# Patient Record
Sex: FEMALE | Race: OTHER | NOT HISPANIC OR LATINO | URBAN - METROPOLITAN AREA
[De-identification: names, ages, dates, MRNs, and addresses within clinical notes are randomized per-mention and may not be internally consistent; named-entity substitution may affect disease eponyms.]

---
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
## 2022-07-03 ENCOUNTER — EMERGENCY (EMERGENCY)
Facility: HOSPITAL | Age: 22
LOS: 1 days | Discharge: HOME | End: 2022-07-03
Attending: EMERGENCY MEDICINE
Payer: MEDICAID

## 2022-07-03 VITALS
OXYGEN SATURATION: 98 % | TEMPERATURE: 98 F | HEART RATE: 107 BPM | WEIGHT: 139.99 LBS | RESPIRATION RATE: 15 BRPM | DIASTOLIC BLOOD PRESSURE: 80 MMHG | SYSTOLIC BLOOD PRESSURE: 116 MMHG | HEIGHT: 66 IN

## 2022-07-03 PROCEDURE — 99283 EMERGENCY DEPT VISIT LOW MDM: CPT

## 2022-07-03 NOTE — ED ADULT TRIAGE NOTE - HEIGHT IN FEET
Your current Orthopaedic problem we are working together to treat is:  Left Achilles tendon rupture.    Plan for surgery at [facility] on Thursday, May 2 at 9:15am (arrive 2.5 hours before surgery at 6:45am)    SURGERY  I have recommended the following surgical procedure: repair of Achilles tendon. Please follow the specific instructions provided by the surgical scheduler for the testing, physician evaluation, pre-operative education and other preparatory requirements of the procedure to help assure a safe and successful result. You can contact Nena MCBRIDE at [phone].    Please shower with the hibiclens body wash the night before and the morning of surgery. Follow the included instructions.       It is recommended you schedule a follow-up appointment with Scott Alvarado MD  2 weeks after surgery.      Office hours are 8:00 am to 5:00 pm Monday through Friday. If it is urgent that you speak with someone outside of these hours, our [facility] Call Center will be able to assist you. You can reach the office by calling the:    [facility]  [address] [place] [address] [phone] during office hours  [phone] after office hours    We do highly recommend [employer], if you do not already have this. You can request access via the internet or by simply talking with a  at any of the clinics.   [email or web address].    You may receive a survey in the mail from [employer]. They mail and process patient satisfaction surveys for our clinic. Should you receive a survey, please take a few minutes to rate your experience with your visit.  We value your opinions and insights. Thank you in advance for your time and interest in responding.    Thank you for choosing [facility] as your Orthopaedic provider!    
5

## 2022-07-04 PROCEDURE — 99283 EMERGENCY DEPT VISIT LOW MDM: CPT

## 2022-07-04 RX ORDER — IBUPROFEN 200 MG
600 TABLET ORAL ONCE
Refills: 0 | Status: COMPLETED | OUTPATIENT
Start: 2022-07-04 | End: 2022-07-04

## 2022-07-04 RX ORDER — IBUPROFEN 200 MG
1 TABLET ORAL
Qty: 40 | Refills: 0
Start: 2022-07-04 | End: 2022-07-13

## 2022-07-04 RX ADMIN — Medication 1 TABLET(S): at 02:13

## 2022-07-04 RX ADMIN — Medication 600 MILLIGRAM(S): at 02:13

## 2022-07-04 NOTE — ED ADULT NURSE NOTE - OBJECTIVE STATEMENT
pt presents to ED with c/o Runny nose, congestion, right earache and toothache for 3 days. Denies any other complaints.

## 2022-07-04 NOTE — ED PROVIDER NOTE - OBJECTIVE STATEMENT
23yo F presents with 2 days right ear pain, dental pain and right nasal congestion. Denies cough or fever.

## 2022-07-04 NOTE — ED PROVIDER NOTE - NSFOLLOWUPINSTRUCTIONS_ED_ALL_ED_FT
take medications as prescribed.  Followup with dentists for reevaluation.  Return to ED if you develop fever>100.8F or facial swelling/pain.      Periodontal Disease       Periodontal disease, also called gum disease or periodontitis, is inflammation or infection that affects the tissue that surrounds and supports the teeth (periodontal tissue). Periodontal tissue includes the gums (gingivae), the tissues that hold the teeth in place (periodontal ligaments), and the bone surrounding the tooth (alveolar bone). Periodontal disease can affect the tissue around one tooth or many teeth. If this condition is not treated, it can cause you to lose your teeth.      What are the causes?    This condition is usually caused by plaque. Plaque contains harmful bacteria that can produce acids and other waste products that cause gums to become swollen and infected. If periodontal disease gets worse, it can also damage other supporting tissues.    Plaque can develop due to:  •Poor oral care, such as not brushing teeth enough or not visiting the dentist regularly.       •Eating and drinking too many sugary foods and beverages.      If the plaque remains on the teeth for a long time, it hardens to form tartar, which can only be removed by a dentist. As the disease progresses, space forms between the teeth and surrounding tissues (pocketing), which can lead to tooth loss.      What increases the risk?    This condition is more likely to develop in people who:  •Smoke.       •Use tobacco.      •Clench or grind their teeth.      •Use drugs and other substances.      •Are having changes in hormones that are caused by puberty, menopause, or pregnancy.      •Are under stress.      •Are taking certain medicines, such as steroids, antiseizure medicines, or medicines to treat cancer.      •Have diabetes.      •Have poor nutrition.      •Have a disease that interferes with the body's disease-fighting system (immune system).      •Have a family history of periodontal disease.        What are the signs or symptoms?    Symptoms of this condition include:  •Red or swollen gums.      •Bad breath that does not go away.      •Gums that have pulled away (receded) from the teeth.      •Gums that bleed easily.      •Teeth that are loose or .      •Pain when chewing.      •Changes in the way your teeth fit together.      •Sensitive teeth.        How is this diagnosed?    This condition is diagnosed with an exam of the tissue around your teeth. Your health care provider may also take an X-ray of your teeth and the tissue around them. He or she will also ask about your medical history.      How is this treated?    Treatment for this condition depends on the extent of the disease, which is determined by a dental exam. Treatment may include:  •Brushing and flossing regularly.      •A deep dental cleaning that requires scraping the buildup of plaque and tartar from below the gum line (scaling and root planing). This may be needed if the disease progresses.      •Antibiotic medicines. These may be taken by mouth, as a rinse, or placed directly into affected areas.    •Surgery, in some severe cases. This may include:  •Surgery flap. Gums are lifted up to remove tartar deposits or to reduce a pocket of periodontal disease.      •Bone and tissue grafting. Damaged areas are replaced in order to help healthy bone and tissue grow.          Follow these instructions at home:      Oral hygiene                   Practice good oral hygiene. To do this:  •Brush your teeth twice a day with a soft toothbrush.      •Floss between your teeth every day.      •Get regular dental exams.      Medicines     •Take over-the-counter and prescription medicines only as told by your health care provider.      •If you were prescribed an antibiotic medicine or a rinse, take or use it as told by your health care provider. Do not stop taking or using the antibiotic even if your condition improves.      General instructions     • Do not use any products that contain nicotine or tobacco, such as cigarettes, e-cigarettes, and chewing tobacco. These can delay healing. If you need help quitting, ask your health care provider.    •Eat a healthy diet that includes plenty of vegetables, fruits, whole grains, low-fat dairy products, and lean protein.  •Do not eat a lot of foods that are high in solid fats, added sugars, or salt.      •Avoid beverages that contain a lot of sugar.          Where to find more information    •American Dental Association: www.mouthhealthy.org        Contact a health care provider if:    •Your symptoms do not improve.        Get help right away if:    •You have swelling in your face, neck, or jaw.      •You have severe pain that does not get better with medicine.      •You have a fever.        Summary    •Periodontal disease, also called gum disease or periodontitis, is inflammation or infection that affects the tissue that surrounds and supports the teeth (periodontal tissue).      •You are more likely to get this disease if you smoke, use tobacco, have a family history of the disease, or have certain conditions, such as diabetes.      •Practice good oral hygiene. Brush your teeth twice a day with a soft toothbrush. Floss between your teeth every day.      • Do not use any products that contain nicotine or tobacco, such as cigarettes, e-cigarettes, and chewing tobacco. If you need help quitting, ask your health care provider.

## 2022-07-04 NOTE — ED PROVIDER NOTE - PATIENT PORTAL LINK FT
You can access the FollowMyHealth Patient Portal offered by [facility] by registering at the following website: [email or web address]. By joining [employer]’s FollowMyHealth portal, you will also be able to view your health information using other applications (apps) compatible with our system.

## 2022-07-04 NOTE — ED PROVIDER NOTE - NSFOLLOWUPCLINICS_GEN_ALL_ED_FT
[facility]  Dental  [address] [place] [id number]  Phone: [phone]  Fax:

## 2022-07-04 NOTE — ED PROVIDER NOTE - CLINICAL SUMMARY MEDICAL DECISION MAKING FREE TEXT BOX
23yo F presents with 2 days right ear pain, dental pain and right nasal congestion. Given augmentin and ibuprofen. patient stable for discharge to followup with dentistry.

## 2022-07-30 NOTE — ED PROVIDER NOTE - DR. NAME
Dr. Zion High updated hair pulled at [phone], patient hasn't voided yet at this time. Patient tried but states he doesn't have the urge. Bladder scan 172 ml. Okay to proceed with discharge and have [facility] follow up. Stacey Kendall)

## 2022-08-24 ENCOUNTER — EMERGENCY (EMERGENCY)
Facility: HOSPITAL | Age: 22
LOS: 1 days | Discharge: HOME | End: 2022-08-24
Attending: STUDENT IN AN ORGANIZED HEALTH CARE EDUCATION/TRAINING PROGRAM
Payer: MEDICAID

## 2022-08-24 VITALS
HEART RATE: 78 BPM | OXYGEN SATURATION: 99 % | RESPIRATION RATE: 16 BRPM | DIASTOLIC BLOOD PRESSURE: 74 MMHG | TEMPERATURE: 99 F | SYSTOLIC BLOOD PRESSURE: 110 MMHG

## 2022-08-24 VITALS
RESPIRATION RATE: 18 BRPM | TEMPERATURE: 98 F | HEART RATE: 97 BPM | HEIGHT: 66 IN | DIASTOLIC BLOOD PRESSURE: 71 MMHG | OXYGEN SATURATION: 97 % | WEIGHT: 138.89 LBS | SYSTOLIC BLOOD PRESSURE: 103 MMHG

## 2022-08-24 LAB
ALBUMIN SERPL ELPH-MCNC: 3.4 G/DL — LOW (ref 3.5–5)
ALP SERPL-CCNC: 85 U/L — SIGNIFICANT CHANGE UP (ref 40–120)
ALT FLD-CCNC: 16 U/L DA — SIGNIFICANT CHANGE UP (ref 10–60)
ANION GAP SERPL CALC-SCNC: 6 MMOL/L — SIGNIFICANT CHANGE UP (ref 5–17)
AST SERPL-CCNC: 16 U/L — SIGNIFICANT CHANGE UP (ref 10–40)
BASOPHILS # BLD AUTO: 0.04 K/UL — SIGNIFICANT CHANGE UP (ref 0–0.2)
BASOPHILS NFR BLD AUTO: 0.2 % — SIGNIFICANT CHANGE UP (ref 0–2)
BILIRUB SERPL-MCNC: 0.3 MG/DL — SIGNIFICANT CHANGE UP (ref 0.2–1.2)
BUN SERPL-MCNC: 6 MG/DL — LOW (ref 7–18)
CALCIUM SERPL-MCNC: 8.8 MG/DL — SIGNIFICANT CHANGE UP (ref 8.4–10.5)
CHLORIDE SERPL-SCNC: 107 MMOL/L — SIGNIFICANT CHANGE UP (ref 96–108)
CO2 SERPL-SCNC: 24 MMOL/L — SIGNIFICANT CHANGE UP (ref 22–31)
CREAT SERPL-MCNC: 0.78 MG/DL — SIGNIFICANT CHANGE UP (ref 0.5–1.3)
EGFR: 111 ML/MIN/1.73M2 — SIGNIFICANT CHANGE UP
EOSINOPHIL # BLD AUTO: 0.01 K/UL — SIGNIFICANT CHANGE UP (ref 0–0.5)
EOSINOPHIL NFR BLD AUTO: 0.1 % — SIGNIFICANT CHANGE UP (ref 0–6)
GLUCOSE SERPL-MCNC: 123 MG/DL — HIGH (ref 70–99)
HCT VFR BLD CALC: 42.6 % — SIGNIFICANT CHANGE UP (ref 34.5–45)
HGB BLD-MCNC: 14 G/DL — SIGNIFICANT CHANGE UP (ref 11.5–15.5)
IMM GRANULOCYTES NFR BLD AUTO: 0.5 % — SIGNIFICANT CHANGE UP (ref 0–1.5)
LYMPHOCYTES # BLD AUTO: 0.94 K/UL — LOW (ref 1–3.3)
LYMPHOCYTES # BLD AUTO: 5.5 % — LOW (ref 13–44)
MCHC RBC-ENTMCNC: 30.5 PG — SIGNIFICANT CHANGE UP (ref 27–34)
MCHC RBC-ENTMCNC: 32.9 GM/DL — SIGNIFICANT CHANGE UP (ref 32–36)
MCV RBC AUTO: 92.8 FL — SIGNIFICANT CHANGE UP (ref 80–100)
MONOCYTES # BLD AUTO: 1.04 K/UL — HIGH (ref 0–0.9)
MONOCYTES NFR BLD AUTO: 6.1 % — SIGNIFICANT CHANGE UP (ref 2–14)
NEUTROPHILS # BLD AUTO: 14.99 K/UL — HIGH (ref 1.8–7.4)
NEUTROPHILS NFR BLD AUTO: 87.6 % — HIGH (ref 43–77)
NRBC # BLD: 0 /100 WBCS — SIGNIFICANT CHANGE UP (ref 0–0)
PLATELET # BLD AUTO: 178 K/UL — SIGNIFICANT CHANGE UP (ref 150–400)
POTASSIUM SERPL-MCNC: 3.9 MMOL/L — SIGNIFICANT CHANGE UP (ref 3.5–5.3)
POTASSIUM SERPL-SCNC: 3.9 MMOL/L — SIGNIFICANT CHANGE UP (ref 3.5–5.3)
PROT SERPL-MCNC: 8.2 G/DL — SIGNIFICANT CHANGE UP (ref 6–8.3)
RBC # BLD: 4.59 M/UL — SIGNIFICANT CHANGE UP (ref 3.8–5.2)
RBC # FLD: 12.5 % — SIGNIFICANT CHANGE UP (ref 10.3–14.5)
S PYO DNA THROAT QL NAA+PROBE: SIGNIFICANT CHANGE UP
SODIUM SERPL-SCNC: 137 MMOL/L — SIGNIFICANT CHANGE UP (ref 135–145)
WBC # BLD: 17.1 K/UL — HIGH (ref 3.8–10.5)
WBC # FLD AUTO: 17.1 K/UL — HIGH (ref 3.8–10.5)

## 2022-08-24 PROCEDURE — 96365 THER/PROPH/DIAG IV INF INIT: CPT

## 2022-08-24 PROCEDURE — 85025 COMPLETE CBC W/AUTO DIFF WBC: CPT

## 2022-08-24 PROCEDURE — 96361 HYDRATE IV INFUSION ADD-ON: CPT

## 2022-08-24 PROCEDURE — 99284 EMERGENCY DEPT VISIT MOD MDM: CPT

## 2022-08-24 PROCEDURE — 87651 STREP A DNA AMP PROBE: CPT

## 2022-08-24 PROCEDURE — 36415 COLL VENOUS BLD VENIPUNCTURE: CPT

## 2022-08-24 PROCEDURE — 87798 DETECT AGENT NOS DNA AMP: CPT

## 2022-08-24 PROCEDURE — 99284 EMERGENCY DEPT VISIT MOD MDM: CPT | Mod: 25

## 2022-08-24 PROCEDURE — 80053 COMPREHEN METABOLIC PANEL: CPT

## 2022-08-24 RX ORDER — DEXAMETHASONE 0.5 MG/5ML
10 ELIXIR ORAL ONCE
Refills: 0 | Status: COMPLETED | OUTPATIENT
Start: 2022-08-24 | End: 2022-08-24

## 2022-08-24 RX ORDER — ACETAMINOPHEN 500 MG
650 TABLET ORAL ONCE
Refills: 0 | Status: COMPLETED | OUTPATIENT
Start: 2022-08-24 | End: 2022-08-24

## 2022-08-24 RX ORDER — IBUPROFEN 200 MG
600 TABLET ORAL ONCE
Refills: 0 | Status: COMPLETED | OUTPATIENT
Start: 2022-08-24 | End: 2022-08-24

## 2022-08-24 RX ORDER — SODIUM CHLORIDE 9 MG/ML
1000 INJECTION INTRAMUSCULAR; INTRAVENOUS; SUBCUTANEOUS ONCE
Refills: 0 | Status: COMPLETED | OUTPATIENT
Start: 2022-08-24 | End: 2022-08-24

## 2022-08-24 RX ORDER — AMPICILLIN SODIUM AND SULBACTAM SODIUM 250; 125 MG/ML; MG/ML
3 INJECTION, POWDER, FOR SUSPENSION INTRAMUSCULAR; INTRAVENOUS ONCE
Refills: 0 | Status: COMPLETED | OUTPATIENT
Start: 2022-08-24 | End: 2022-08-24

## 2022-08-24 RX ADMIN — Medication 600 MILLIGRAM(S): at 15:10

## 2022-08-24 RX ADMIN — Medication 650 MILLIGRAM(S): at 17:54

## 2022-08-24 RX ADMIN — AMPICILLIN SODIUM AND SULBACTAM SODIUM 200 GRAM(S): 250; 125 INJECTION, POWDER, FOR SUSPENSION INTRAMUSCULAR; INTRAVENOUS at 17:12

## 2022-08-24 RX ADMIN — SODIUM CHLORIDE 1000 MILLILITER(S): 9 INJECTION INTRAMUSCULAR; INTRAVENOUS; SUBCUTANEOUS at 16:11

## 2022-08-24 RX ADMIN — AMPICILLIN SODIUM AND SULBACTAM SODIUM 3 GRAM(S): 250; 125 INJECTION, POWDER, FOR SUSPENSION INTRAMUSCULAR; INTRAVENOUS at 17:42

## 2022-08-24 RX ADMIN — SODIUM CHLORIDE 1000 MILLILITER(S): 9 INJECTION INTRAMUSCULAR; INTRAVENOUS; SUBCUTANEOUS at 17:16

## 2022-08-24 RX ADMIN — Medication 650 MILLIGRAM(S): at 17:24

## 2022-08-24 RX ADMIN — Medication 10 MILLIGRAM(S): at 15:10

## 2022-08-24 RX ADMIN — Medication 600 MILLIGRAM(S): at 15:40

## 2022-08-24 NOTE — ED PROVIDER NOTE - PATIENT PORTAL LINK FT
You can access the FollowMyHealth Patient Portal offered by [facility] by registering at the following website: [email or web address]. By joining [employer]’s FollowMyHealth portal, you will also be able to view your health information using other applications (apps) compatible with our system.

## 2022-08-24 NOTE — ED PROVIDER NOTE - PROGRESS NOTE DETAILS
Patient feeling improved after fluids/decadron in the ED. Will treat with augmentin for tonsillitis.

## 2022-08-24 NOTE — ED PROVIDER NOTE - CLINICAL SUMMARY MEDICAL DECISION MAKING FREE TEXT BOX
23 y/o F presenting to the ED c/o sore throat. Vitals stable. She is in no acute distress. Her throat is mildly erythematous with exudate. Plan for strep test, decadron, pain control. Likely treat with abx.

## 2022-08-24 NOTE — ED PROVIDER NOTE - OBJECTIVE STATEMENT
23 y/o F presenting to the ED c/o sore throat x 1 day. She endorses headache and difficulty swallowing. No fever, chills, N/V. She has a hx of strep infections. States this feels worse. Denies sick contacts. No runny nose, no vomiting, no cough.

## 2022-08-24 NOTE — ED PROVIDER NOTE - NS ED ROS FT
CONST: no fevers, no chills  EYES: no pain, no vision changes  ENT: + sore throat, no ear pain, no change in hearing  CV: no chest pain, no leg swelling  RESP: no shortness of breath, no cough  ABD: no abdominal pain, no nausea, no vomiting, no diarrhea  : no dysuria, no flank pain, no hematuria  MSK: no back pain, no extremity pain  NEURO: + headache, no other additional neurologic complaints  HEME: no easy bleeding  SKIN:  no rash

## 2022-08-24 NOTE — ED PROVIDER NOTE - PHYSICAL EXAMINATION
GENERAL: Awake, alert, NAD  HEENT: NC/AT, moist mucous membranes, throat mildly erythematous with tonsillar exudate, uvula midline, no PTA  LUNGS: CTAB, no wheezes or crackles   CARDIAC: RRR, no m/r/g  EXT: No edema, no calf tenderness, 2+ DP pulses bilaterally, no deformities.  NEURO: A&Ox3. Moving all extremities.  SKIN: Warm and dry. No rash.  PSYCH: Normal affect.

## 2022-08-24 NOTE — ED ADULT NURSE NOTE - HIV OFFER
PHYSICAL EXAM:  Vital Signs Last 24 Hrs  T(C): 38.8 (25 Jun 2020 09:35), Max: 39.5 (25 Jun 2020 07:50)  T(F): 101.9 (25 Jun 2020 09:35), Max: 103.1 (25 Jun 2020 07:50)  HR: 116 (25 Jun 2020 11:04) (116 - 140)  BP: 94/51 (25 Jun 2020 11:04) (84/46 - 109/72)  BP(mean): --  RR: 19 (25 Jun 2020 11:04) (19 - 20)  SpO2: 100% (25 Jun 2020 11:04) (100% - 100%)    GENERAL: NAD, well-groomed, well-developed  HEAD:  Atraumatic, Normocephalic  EYES: EOMI, PERRLA, conjunctiva and sclera clear  ENMT: No tonsillar erythema, exudates, or enlargement; Moist mucous membranes  NERVOUS SYSTEM:  Alert & Oriented X3, Good concentration; Motor Strength 5/5 B/L upper and lower extremities  CHEST/LUNG: Clear to auscultation on right, left rhonchi sided rhonchi; No wheezing  HEART: Regular rate and rhythm; No murmurs  ABDOMEN: Soft, Nontender, Nondistended; Bowel sounds present  EXTREMITIES:  2+ Peripheral Pulses, No clubbing, cyanosis, or edema Previously Declined (within the last year)

## 2022-08-24 NOTE — ED PROVIDER NOTE - NSFOLLOWUPINSTRUCTIONS_ED_ALL_ED_FT
You were seen in the ED and diagnosed with tonsillits.    Take antibiotics as prescribed.    Use motrin every 6 hours as needed for pain.    Drink plenty of fluids and stay hydrated.    If you experience worsening pain or ability to swallowing, high fever, severe vomiting, or other concerning symptoms please return to the ED.

## 2025-02-26 NOTE — ED PEDIATRIC NURSE NOTE - RESPIRATORY WDL
Breathing spontaneous and unlabored. Breath sounds clear and equal bilaterally with regular rhythm. Labs:  1/9: FT4 0.8 TSH 2.2  ; GTT -514-150  12/12/24: Hb 10.9 Normal MCV & Hemoglobin electrophoresis HbA1c 5.4%  HIV/HBsAg/Hep C/RPR neg, Rubella immune.  Blood type A+; antibody screen negative.  Urine culture neg  Panorama negative for Trisomy 13, 18 & 21; fetal fraction 12.1% male fetus.  Horizon 274 neg  MSAFP 0.64 MoM  1hr gct 147  3hr gtt 72/197/189/150  ?  OBUS  9/27/24 17w3d S=D, normal early anatomy. 2 cm intramural fibroid.  10/25/24 21w3d S=D, normal detailed anatomy. Tiny fibroid no longer apparent.  1/9/25 32w2d S=D, EFW 49%; AC = 35%; MVP 6.7 cm. vtx, anterior placenta; BPP 8/8.  2/6/25 36w2d S=D, EFW 30%; AC = 15%; MVP 6 cm. vtx, anterior placenta; BPP 8/8.

## 2025-04-09 NOTE — ED PEDIATRIC NURSE NOTE - NSFALLRSKINDICATORS_ED_ALL_ED
Reviewed extensive labs not showing anything that might be contributing to macrocytic anemia. So currently working etiologies for anemia include methotrexate and anemia of chronic inflammation. I recommended we see her back in early June 2025 with repeat CBC and iron studies at that point. If Hgb still quite low then will plan for BMB and even consider ESAs pending BMB results.   
no